# Patient Record
Sex: FEMALE | Race: WHITE | NOT HISPANIC OR LATINO
[De-identification: names, ages, dates, MRNs, and addresses within clinical notes are randomized per-mention and may not be internally consistent; named-entity substitution may affect disease eponyms.]

---

## 2020-01-28 ENCOUNTER — TRANSCRIPTION ENCOUNTER (OUTPATIENT)
Age: 25
End: 2020-01-28

## 2022-07-17 ENCOUNTER — NON-APPOINTMENT (OUTPATIENT)
Age: 27
End: 2022-07-17

## 2022-11-01 ENCOUNTER — APPOINTMENT (OUTPATIENT)
Dept: OBGYN | Facility: CLINIC | Age: 27
End: 2022-11-01
Payer: COMMERCIAL

## 2022-11-01 ENCOUNTER — ASOB RESULT (OUTPATIENT)
Age: 27
End: 2022-11-01

## 2022-11-01 VITALS
RESPIRATION RATE: 15 BRPM | OXYGEN SATURATION: 97 % | WEIGHT: 125 LBS | HEART RATE: 91 BPM | TEMPERATURE: 98.3 F | BODY MASS INDEX: 22.15 KG/M2 | SYSTOLIC BLOOD PRESSURE: 97 MMHG | HEIGHT: 63 IN | DIASTOLIC BLOOD PRESSURE: 67 MMHG

## 2022-11-01 DIAGNOSIS — Z83.3 FAMILY HISTORY OF DIABETES MELLITUS: ICD-10-CM

## 2022-11-01 PROCEDURE — 81025 URINE PREGNANCY TEST: CPT

## 2022-11-01 PROCEDURE — 99204 OFFICE O/P NEW MOD 45 MIN: CPT | Mod: 25

## 2022-11-01 PROCEDURE — 36415 COLL VENOUS BLD VENIPUNCTURE: CPT

## 2022-11-01 PROCEDURE — 76817 TRANSVAGINAL US OBSTETRIC: CPT

## 2022-11-01 RX ORDER — DOXYLAMINE SUCCINATE AND PYRIDOXINE HYDROCHLORIDE 10; 10 MG/1; MG/1
10-10 TABLET, DELAYED RELEASE ORAL
Qty: 60 | Refills: 3 | Status: ACTIVE | COMMUNITY
Start: 2022-11-01 | End: 1900-01-01

## 2022-11-02 LAB
ABO + RH PNL BLD: NORMAL
BASOPHILS # BLD AUTO: 0.03 K/UL
BASOPHILS NFR BLD AUTO: 0.3 %
BLD GP AB SCN SERPL QL: NORMAL
C TRACH RRNA SPEC QL NAA+PROBE: NOT DETECTED
EOSINOPHIL # BLD AUTO: 0.12 K/UL
EOSINOPHIL NFR BLD AUTO: 1.2 %
ESTIMATED AVERAGE GLUCOSE: 88 MG/DL
HBA1C MFR BLD HPLC: 4.7 %
HBV SURFACE AG SER QL: NONREACTIVE
HCG UR QL: POSITIVE
HCT VFR BLD CALC: 36.5 %
HCV AB SER QL: NONREACTIVE
HCV S/CO RATIO: 0.21 S/CO
HGB A MFR BLD: 97.3 %
HGB A2 MFR BLD: 2.7 %
HGB BLD-MCNC: 12.2 G/DL
HGB FRACT BLD-IMP: NORMAL
HIV1+2 AB SPEC QL IA.RAPID: NONREACTIVE
IMM GRANULOCYTES NFR BLD AUTO: 0.5 %
LYMPHOCYTES # BLD AUTO: 2.16 K/UL
LYMPHOCYTES NFR BLD AUTO: 21.8 %
MAN DIFF?: NORMAL
MCHC RBC-ENTMCNC: 30.7 PG
MCHC RBC-ENTMCNC: 33.4 GM/DL
MCV RBC AUTO: 91.9 FL
MEV IGG FLD QL IA: 26 AU/ML
MEV IGG+IGM SER-IMP: POSITIVE
MONOCYTES # BLD AUTO: 0.46 K/UL
MONOCYTES NFR BLD AUTO: 4.6 %
N GONORRHOEA RRNA SPEC QL NAA+PROBE: NOT DETECTED
NEUTROPHILS # BLD AUTO: 7.1 K/UL
NEUTROPHILS NFR BLD AUTO: 71.6 %
PLATELET # BLD AUTO: 206 K/UL
QUALITY CONTROL: YES
RBC # BLD: 3.97 M/UL
RBC # FLD: 13.6 %
RUBV IGG FLD-ACNC: 2.6 INDEX
RUBV IGG SER-IMP: POSITIVE
SOURCE AMPLIFICATION: NORMAL
T PALLIDUM AB SER QL IA: NEGATIVE
VZV AB TITR SER: NEGATIVE
VZV IGG SER IF-ACNC: 30 INDEX
WBC # FLD AUTO: 9.92 K/UL

## 2022-11-02 NOTE — PLAN
[FreeTextEntry1] : 26yo P0  @  9w 5d (MACRINA by LMP/US, confirmed with [facility]) here for initial prenatal visit. \par \par Discussed following with patient:\par 1. Patient was oriented to the practice and goal/plan of care. \par 2. Adequate/optimal weight gain during pregnancy discussed. Nutrition counseling provided. \par 3. Diet precautions were reviewed including food borne infections such as listeria, salmonella. Advised to avoid high mercury content fish. \par 4. Exercise guidelines discussed. encouraged at least 30minute/day moderate intensity exercise/activity 5 times a week as tolerated. Advised to avoid any activities that may involve fall, direct trauma. Advised to avoid hot tubs or saunas. \par 5. Travel plans discussed. \par 6. COVID-19 discussed. General precautions reviewed. Pregnancy specific issues such as risk of infection on pregnant patients, vertical transmission and hospital policy discussed. \par 7. Aneuploidy test including screening tests (NIPT, sequential screen) and diagnostic tests (CVS, amniocentesis) discussed. Through shared decision making patient decided to get ultrascreen and NIPT. \par 8. nausea and vomiting in pregnancy: lifestyle modifications discussed. unison/pyridoxine recommended. \par 9. routine prenatal labs sent\par \par All questions were answered to patient's satisfaction. Patient verbalized understanding.\par Overall 45 minutes were spent with the patient during this visit\par Alla SIMMONS\par

## 2022-11-02 NOTE — HISTORY OF PRESENT ILLNESS
[FreeTextEntry1] : 26yo P0 LMP 8/25/22 here for positive pregnancy test.\par nausea. no vb or cramping.\par \par pt here with  Michele. 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 \par \par pap 7/2022= neg

## 2022-11-03 LAB — LEAD BLD-MCNC: <1 UG/DL

## 2022-11-04 LAB — BACTERIA UR CULT: NORMAL

## 2022-11-07 LAB — FMR1 GENE MUT ANL BLD/T: NORMAL

## 2022-11-08 ENCOUNTER — APPOINTMENT (OUTPATIENT)
Dept: OBGYN | Facility: CLINIC | Age: 27
End: 2022-11-08

## 2022-11-08 ENCOUNTER — TRANSCRIPTION ENCOUNTER (OUTPATIENT)
Age: 27
End: 2022-11-08

## 2022-11-08 VITALS
SYSTOLIC BLOOD PRESSURE: 89 MMHG | OXYGEN SATURATION: 96 % | TEMPERATURE: 98.4 F | DIASTOLIC BLOOD PRESSURE: 59 MMHG | HEART RATE: 105 BPM | WEIGHT: 127 LBS | BODY MASS INDEX: 22.5 KG/M2 | RESPIRATION RATE: 15 BRPM | HEIGHT: 63 IN

## 2022-11-08 DIAGNOSIS — Z32.01 ENCOUNTER FOR PREGNANCY TEST, RESULT POSITIVE: ICD-10-CM

## 2022-11-08 LAB — AR GENE MUT ANL BLD/T: NORMAL

## 2022-11-08 PROCEDURE — 0501F PRENATAL FLOW SHEET: CPT

## 2022-11-08 PROCEDURE — 36415 COLL VENOUS BLD VENIPUNCTURE: CPT

## 2022-11-09 LAB — CFTR MUT TESTED BLD/T: NEGATIVE

## 2022-11-14 ENCOUNTER — ASOB RESULT (OUTPATIENT)
Age: 27
End: 2022-11-14

## 2022-11-14 ENCOUNTER — APPOINTMENT (OUTPATIENT)
Dept: ANTEPARTUM | Facility: CLINIC | Age: 27
End: 2022-11-14

## 2022-11-14 PROCEDURE — 76813 OB US NUCHAL MEAS 1 GEST: CPT | Mod: 59

## 2022-11-14 PROCEDURE — 76801 OB US < 14 WKS SINGLE FETUS: CPT

## 2022-12-20 ENCOUNTER — APPOINTMENT (OUTPATIENT)
Dept: OBGYN | Facility: CLINIC | Age: 27
End: 2022-12-20

## 2022-12-20 VITALS
TEMPERATURE: 98.4 F | HEIGHT: 63 IN | HEART RATE: 89 BPM | BODY MASS INDEX: 23.39 KG/M2 | OXYGEN SATURATION: 97 % | SYSTOLIC BLOOD PRESSURE: 101 MMHG | WEIGHT: 132 LBS | RESPIRATION RATE: 15 BRPM | DIASTOLIC BLOOD PRESSURE: 67 MMHG

## 2022-12-20 DIAGNOSIS — Z34.91 ENCOUNTER FOR SUPERVISION OF NORMAL PREGNANCY, UNSPECIFIED, FIRST TRIMESTER: ICD-10-CM

## 2022-12-20 DIAGNOSIS — O21.9 VOMITING OF PREGNANCY, UNSPECIFIED: ICD-10-CM

## 2022-12-20 PROCEDURE — 0502F SUBSEQUENT PRENATAL CARE: CPT

## 2022-12-20 PROCEDURE — 36415 COLL VENOUS BLD VENIPUNCTURE: CPT

## 2022-12-21 PROBLEM — O21.9 NAUSEA AND VOMITING OF PREGNANCY, ANTEPARTUM: Status: RESOLVED | Noted: 2022-11-01 | Resolved: 2022-12-21

## 2022-12-23 LAB
AFP MOM: 1.16
AFP VALUE: 49.1 NG/ML
ALPHA FETOPROTEIN SERUM COMMENT: NORMAL
ALPHA FETOPROTEIN SERUM INTERPRETATION: NORMAL
ALPHA FETOPROTEIN SERUM RESULTS: NORMAL
ALPHA FETOPROTEIN SERUM TEST RESULTS: NORMAL
GESTATIONAL AGE BASED ON: NORMAL
GESTATIONAL AGE ON COLLECTION DATE: 16.7 WEEKS
INSULIN DEP DIABETES: NO
MATERNAL AGE AT EDD AFP: 28.4 YR
MULTIPLE GESTATION: NO
OSBR RISK 1 IN: 7366
RACE: NORMAL
WEIGHT AFP: 130 LBS

## 2023-01-17 ENCOUNTER — APPOINTMENT (OUTPATIENT)
Dept: ANTEPARTUM | Facility: CLINIC | Age: 28
End: 2023-01-17
Payer: COMMERCIAL

## 2023-01-17 ENCOUNTER — ASOB RESULT (OUTPATIENT)
Age: 28
End: 2023-01-17

## 2023-01-17 PROCEDURE — 76811 OB US DETAILED SNGL FETUS: CPT

## 2023-01-26 ENCOUNTER — APPOINTMENT (OUTPATIENT)
Dept: OBGYN | Facility: CLINIC | Age: 28
End: 2023-01-26
Payer: COMMERCIAL

## 2023-01-26 VITALS
RESPIRATION RATE: 15 BRPM | HEIGHT: 63 IN | SYSTOLIC BLOOD PRESSURE: 105 MMHG | WEIGHT: 142 LBS | HEART RATE: 107 BPM | DIASTOLIC BLOOD PRESSURE: 69 MMHG | BODY MASS INDEX: 25.16 KG/M2 | TEMPERATURE: 97.9 F | OXYGEN SATURATION: 96 %

## 2023-01-26 PROCEDURE — 0502F SUBSEQUENT PRENATAL CARE: CPT

## 2023-02-23 ENCOUNTER — APPOINTMENT (OUTPATIENT)
Dept: OBGYN | Facility: CLINIC | Age: 28
End: 2023-02-23
Payer: COMMERCIAL

## 2023-02-23 VITALS
DIASTOLIC BLOOD PRESSURE: 60 MMHG | HEART RATE: 111 BPM | OXYGEN SATURATION: 99 % | WEIGHT: 143 LBS | BODY MASS INDEX: 25.34 KG/M2 | RESPIRATION RATE: 18 BRPM | SYSTOLIC BLOOD PRESSURE: 93 MMHG | HEIGHT: 63 IN | TEMPERATURE: 98 F

## 2023-02-23 LAB
GLUCOSE UR-MCNC: NEGATIVE
KETONES UR-MCNC: NEGATIVE
LEUKOCYTE ESTERASE UR QL STRIP: NORMAL
NITRITE UR QL STRIP: NEGATIVE
PROT UR STRIP-MCNC: NORMAL

## 2023-02-23 PROCEDURE — 36415 COLL VENOUS BLD VENIPUNCTURE: CPT

## 2023-02-23 PROCEDURE — 0502F SUBSEQUENT PRENATAL CARE: CPT

## 2023-03-01 LAB
BASOPHILS # BLD AUTO: 0.02 K/UL
BASOPHILS NFR BLD AUTO: 0.2 %
EOSINOPHIL # BLD AUTO: 0.06 K/UL
EOSINOPHIL NFR BLD AUTO: 0.6 %
GLUCOSE 1H P 50 G GLC PO SERPL-MCNC: 118 MG/DL
HCT VFR BLD CALC: 31.2 %
HGB BLD-MCNC: 9.3 G/DL
IMM GRANULOCYTES NFR BLD AUTO: 2.1 %
LYMPHOCYTES # BLD AUTO: 1.5 K/UL
LYMPHOCYTES NFR BLD AUTO: 14.1 %
MAN DIFF?: NORMAL
MCHC RBC-ENTMCNC: 29.2 PG
MCHC RBC-ENTMCNC: 29.8 GM/DL
MCV RBC AUTO: 97.8 FL
MONOCYTES # BLD AUTO: 0.53 K/UL
MONOCYTES NFR BLD AUTO: 5 %
NEUTROPHILS # BLD AUTO: 8.28 K/UL
NEUTROPHILS NFR BLD AUTO: 78 %
PLATELET # BLD AUTO: 160 K/UL
RBC # BLD: 3.19 M/UL
RBC # FLD: 15.2 %
WBC # FLD AUTO: 10.61 K/UL

## 2023-03-01 RX ORDER — CHLORHEXIDINE GLUCONATE 4 %
325 (65 FE) LIQUID (ML) TOPICAL TWICE DAILY
Qty: 60 | Refills: 6 | Status: ACTIVE | COMMUNITY
Start: 2023-03-01 | End: 1900-01-01

## 2023-03-15 ENCOUNTER — NON-APPOINTMENT (OUTPATIENT)
Age: 28
End: 2023-03-15

## 2023-03-15 ENCOUNTER — APPOINTMENT (OUTPATIENT)
Dept: OBGYN | Facility: CLINIC | Age: 28
End: 2023-03-15
Payer: COMMERCIAL

## 2023-03-15 VITALS
BODY MASS INDEX: 26.05 KG/M2 | OXYGEN SATURATION: 93 % | TEMPERATURE: 98.7 F | HEART RATE: 100 BPM | RESPIRATION RATE: 18 BRPM | DIASTOLIC BLOOD PRESSURE: 79 MMHG | WEIGHT: 147 LBS | HEIGHT: 63 IN | SYSTOLIC BLOOD PRESSURE: 118 MMHG

## 2023-03-15 DIAGNOSIS — Z34.92 ENCOUNTER FOR SUPERVISION OF NORMAL PREGNANCY, UNSPECIFIED, SECOND TRIMESTER: ICD-10-CM

## 2023-03-15 LAB
GLUCOSE UR-MCNC: NORMAL
HGB UR QL STRIP.AUTO: NORMAL
KETONES UR-MCNC: NORMAL
LEUKOCYTE ESTERASE UR QL STRIP: NORMAL
NITRITE UR QL STRIP: NORMAL
PROT UR STRIP-MCNC: NORMAL

## 2023-03-15 PROCEDURE — 0502F SUBSEQUENT PRENATAL CARE: CPT

## 2023-03-29 ENCOUNTER — ASOB RESULT (OUTPATIENT)
Age: 28
End: 2023-03-29

## 2023-03-29 ENCOUNTER — APPOINTMENT (OUTPATIENT)
Dept: OBGYN | Facility: CLINIC | Age: 28
End: 2023-03-29
Payer: COMMERCIAL

## 2023-03-29 VITALS
HEART RATE: 104 BPM | BODY MASS INDEX: 26.58 KG/M2 | RESPIRATION RATE: 18 BRPM | TEMPERATURE: 98.2 F | HEIGHT: 63 IN | SYSTOLIC BLOOD PRESSURE: 107 MMHG | OXYGEN SATURATION: 97 % | WEIGHT: 150 LBS | DIASTOLIC BLOOD PRESSURE: 72 MMHG

## 2023-03-29 LAB
GLUCOSE UR-MCNC: NEGATIVE
KETONES UR-MCNC: NEGATIVE
LEUKOCYTE ESTERASE UR QL STRIP: NORMAL
NITRITE UR QL STRIP: NEGATIVE
PROT UR STRIP-MCNC: NEGATIVE

## 2023-03-29 PROCEDURE — ZZZZZ: CPT

## 2023-03-29 PROCEDURE — 76819 FETAL BIOPHYS PROFIL W/O NST: CPT

## 2023-03-29 PROCEDURE — 0502F SUBSEQUENT PRENATAL CARE: CPT

## 2023-04-12 ENCOUNTER — APPOINTMENT (OUTPATIENT)
Dept: OBGYN | Facility: CLINIC | Age: 28
End: 2023-04-12
Payer: COMMERCIAL

## 2023-04-12 VITALS
WEIGHT: 155 LBS | RESPIRATION RATE: 18 BRPM | HEIGHT: 63 IN | DIASTOLIC BLOOD PRESSURE: 73 MMHG | BODY MASS INDEX: 27.46 KG/M2 | HEART RATE: 105 BPM | OXYGEN SATURATION: 95 % | SYSTOLIC BLOOD PRESSURE: 111 MMHG | TEMPERATURE: 98.5 F

## 2023-04-12 PROCEDURE — 0502F SUBSEQUENT PRENATAL CARE: CPT

## 2023-04-13 LAB
BILIRUB UR QL STRIP: NEGATIVE
GLUCOSE UR-MCNC: NEGATIVE
HCG UR QL: 0.2 EU/DL
HGB UR QL STRIP.AUTO: NEGATIVE
KETONES UR-MCNC: NEGATIVE
LEUKOCYTE ESTERASE UR QL STRIP: NORMAL
NITRITE UR QL STRIP: NEGATIVE
PH UR STRIP: 7
PROT UR STRIP-MCNC: NEGATIVE
SP GR UR STRIP: 1.01

## 2023-04-19 ENCOUNTER — ASOB RESULT (OUTPATIENT)
Age: 28
End: 2023-04-19

## 2023-04-19 ENCOUNTER — APPOINTMENT (OUTPATIENT)
Dept: ANTEPARTUM | Facility: CLINIC | Age: 28
End: 2023-04-19
Payer: COMMERCIAL

## 2023-04-19 PROCEDURE — 76816 OB US FOLLOW-UP PER FETUS: CPT

## 2023-04-26 ENCOUNTER — APPOINTMENT (OUTPATIENT)
Dept: OBGYN | Facility: CLINIC | Age: 28
End: 2023-04-26
Payer: COMMERCIAL

## 2023-04-26 VITALS
HEIGHT: 63 IN | DIASTOLIC BLOOD PRESSURE: 69 MMHG | OXYGEN SATURATION: 100 % | RESPIRATION RATE: 18 BRPM | SYSTOLIC BLOOD PRESSURE: 99 MMHG | WEIGHT: 157 LBS | BODY MASS INDEX: 27.82 KG/M2 | TEMPERATURE: 98.5 F | HEART RATE: 96 BPM

## 2023-04-26 LAB
BILIRUB UR QL STRIP: NEGATIVE
CLARITY UR: CLEAR
COLLECTION METHOD: NORMAL
GLUCOSE UR-MCNC: NEGATIVE
HCG UR QL: 0.2 EU/DL
HGB UR QL STRIP.AUTO: NEGATIVE
KETONES UR-MCNC: NEGATIVE
LEUKOCYTE ESTERASE UR QL STRIP: ABNORMAL
NITRITE UR QL STRIP: NEGATIVE
PH UR STRIP: 7
PROT UR STRIP-MCNC: NEGATIVE
SP GR UR STRIP: 1.01

## 2023-04-26 PROCEDURE — 0502F SUBSEQUENT PRENATAL CARE: CPT

## 2023-05-10 ENCOUNTER — APPOINTMENT (OUTPATIENT)
Dept: OBGYN | Facility: CLINIC | Age: 28
End: 2023-05-10
Payer: COMMERCIAL

## 2023-05-10 VITALS
RESPIRATION RATE: 18 BRPM | BODY MASS INDEX: 28.53 KG/M2 | TEMPERATURE: 98.1 F | SYSTOLIC BLOOD PRESSURE: 118 MMHG | OXYGEN SATURATION: 97 % | WEIGHT: 161 LBS | DIASTOLIC BLOOD PRESSURE: 77 MMHG | HEIGHT: 63 IN | HEART RATE: 102 BPM

## 2023-05-10 PROCEDURE — 36415 COLL VENOUS BLD VENIPUNCTURE: CPT

## 2023-05-10 PROCEDURE — 0502F SUBSEQUENT PRENATAL CARE: CPT

## 2023-05-11 LAB
BASOPHILS # BLD AUTO: 0.03 K/UL
BASOPHILS NFR BLD AUTO: 0.3 %
EOSINOPHIL # BLD AUTO: 0.07 K/UL
EOSINOPHIL NFR BLD AUTO: 0.6 %
HCT VFR BLD CALC: 36.8 %
HGB BLD-MCNC: 11.2 G/DL
HIV1+2 AB SPEC QL IA.RAPID: NONREACTIVE
IMM GRANULOCYTES NFR BLD AUTO: 2.1 %
LYMPHOCYTES # BLD AUTO: 1.85 K/UL
LYMPHOCYTES NFR BLD AUTO: 15.8 %
MAN DIFF?: NORMAL
MCHC RBC-ENTMCNC: 29.6 PG
MCHC RBC-ENTMCNC: 30.4 GM/DL
MCV RBC AUTO: 97.1 FL
MONOCYTES # BLD AUTO: 0.6 K/UL
MONOCYTES NFR BLD AUTO: 5.1 %
NEUTROPHILS # BLD AUTO: 8.9 K/UL
NEUTROPHILS NFR BLD AUTO: 76.1 %
PLATELET # BLD AUTO: 143 K/UL
RBC # BLD: 3.79 M/UL
RBC # FLD: 18.5 %
T PALLIDUM AB SER QL IA: NEGATIVE
WBC # FLD AUTO: 11.69 K/UL

## 2023-05-15 LAB
C TRACH RRNA SPEC QL NAA+PROBE: NOT DETECTED
GP B STREP DNA SPEC QL NAA+PROBE: DETECTED
N GONORRHOEA RRNA SPEC QL NAA+PROBE: NOT DETECTED
SOURCE AMPLIFICATION: NORMAL
SOURCE GBS: NORMAL

## 2023-05-17 ENCOUNTER — ASOB RESULT (OUTPATIENT)
Age: 28
End: 2023-05-17

## 2023-05-17 ENCOUNTER — APPOINTMENT (OUTPATIENT)
Dept: OBGYN | Facility: CLINIC | Age: 28
End: 2023-05-17
Payer: COMMERCIAL

## 2023-05-17 ENCOUNTER — APPOINTMENT (OUTPATIENT)
Dept: ANTEPARTUM | Facility: CLINIC | Age: 28
End: 2023-05-17
Payer: COMMERCIAL

## 2023-05-17 VITALS
TEMPERATURE: 98.4 F | HEIGHT: 63 IN | RESPIRATION RATE: 18 BRPM | BODY MASS INDEX: 28.7 KG/M2 | SYSTOLIC BLOOD PRESSURE: 104 MMHG | WEIGHT: 162 LBS | DIASTOLIC BLOOD PRESSURE: 73 MMHG | OXYGEN SATURATION: 97 % | HEART RATE: 101 BPM

## 2023-05-17 LAB
BILIRUB UR QL STRIP: NEGATIVE
GLUCOSE UR-MCNC: NEGATIVE
HCG UR QL: 0.2 EU/DL
HGB UR QL STRIP.AUTO: NEGATIVE
KETONES UR-MCNC: NEGATIVE
LEUKOCYTE ESTERASE UR QL STRIP: NEGATIVE
NITRITE UR QL STRIP: NEGATIVE
PH UR STRIP: 7
PROT UR STRIP-MCNC: NEGATIVE
SP GR UR STRIP: 1.01

## 2023-05-17 PROCEDURE — 0502F SUBSEQUENT PRENATAL CARE: CPT

## 2023-05-17 PROCEDURE — 76816 OB US FOLLOW-UP PER FETUS: CPT

## 2023-05-24 ENCOUNTER — APPOINTMENT (OUTPATIENT)
Dept: OBGYN | Facility: CLINIC | Age: 28
End: 2023-05-24
Payer: COMMERCIAL

## 2023-05-24 ENCOUNTER — OUTPATIENT (OUTPATIENT)
Dept: OUTPATIENT SERVICES | Facility: HOSPITAL | Age: 28
LOS: 1 days | End: 2023-05-24

## 2023-05-24 VITALS
RESPIRATION RATE: 16 BRPM | DIASTOLIC BLOOD PRESSURE: 76 MMHG | TEMPERATURE: 98.1 F | WEIGHT: 164 LBS | HEIGHT: 63 IN | OXYGEN SATURATION: 98 % | HEART RATE: 101 BPM | SYSTOLIC BLOOD PRESSURE: 105 MMHG | BODY MASS INDEX: 29.06 KG/M2

## 2023-05-24 DIAGNOSIS — Z01.818 ENCOUNTER FOR OTHER PREPROCEDURAL EXAMINATION: ICD-10-CM

## 2023-05-24 DIAGNOSIS — Z34.00 ENCOUNTER FOR SUPERVISION OF NORMAL FIRST PREGNANCY, UNSPECIFIED TRIMESTER: ICD-10-CM

## 2023-05-24 LAB
ALBUMIN SERPL ELPH-MCNC: 2.9 G/DL — LOW (ref 3.5–5)
ALP SERPL-CCNC: 118 U/L — SIGNIFICANT CHANGE UP (ref 40–120)
ALT FLD-CCNC: 17 U/L DA — SIGNIFICANT CHANGE UP (ref 10–60)
ANION GAP SERPL CALC-SCNC: 8 MMOL/L — SIGNIFICANT CHANGE UP (ref 5–17)
APTT BLD: 23.3 SEC — LOW (ref 27.5–35.5)
AST SERPL-CCNC: 19 U/L — SIGNIFICANT CHANGE UP (ref 10–40)
BILIRUB SERPL-MCNC: 0.4 MG/DL — SIGNIFICANT CHANGE UP (ref 0.2–1.2)
BILIRUB UR QL STRIP: NEGATIVE
BLD GP AB SCN SERPL QL: SIGNIFICANT CHANGE UP
BUN SERPL-MCNC: 8 MG/DL — SIGNIFICANT CHANGE UP (ref 7–18)
CALCIUM SERPL-MCNC: 9.2 MG/DL — SIGNIFICANT CHANGE UP (ref 8.4–10.5)
CHLORIDE SERPL-SCNC: 106 MMOL/L — SIGNIFICANT CHANGE UP (ref 96–108)
CO2 SERPL-SCNC: 24 MMOL/L — SIGNIFICANT CHANGE UP (ref 22–31)
CREAT SERPL-MCNC: 0.5 MG/DL — SIGNIFICANT CHANGE UP (ref 0.5–1.3)
EGFR: 131 ML/MIN/1.73M2 — SIGNIFICANT CHANGE UP
GLUCOSE SERPL-MCNC: 87 MG/DL — SIGNIFICANT CHANGE UP (ref 70–99)
GLUCOSE UR-MCNC: NEGATIVE
HCG UR QL: 0.2 EU/DL
HCT VFR BLD CALC: 36.4 % — SIGNIFICANT CHANGE UP (ref 34.5–45)
HGB BLD-MCNC: 12 G/DL — SIGNIFICANT CHANGE UP (ref 11.5–15.5)
HGB UR QL STRIP.AUTO: NEGATIVE
INR BLD: 0.98 RATIO — SIGNIFICANT CHANGE UP (ref 0.88–1.16)
KETONES UR-MCNC: NEGATIVE
LEUKOCYTE ESTERASE UR QL STRIP: NEGATIVE
MCHC RBC-ENTMCNC: 30.9 PG — SIGNIFICANT CHANGE UP (ref 27–34)
MCHC RBC-ENTMCNC: 33 GM/DL — SIGNIFICANT CHANGE UP (ref 32–36)
MCV RBC AUTO: 93.8 FL — SIGNIFICANT CHANGE UP (ref 80–100)
NITRITE UR QL STRIP: NEGATIVE
NRBC # BLD: 0 /100 WBCS — SIGNIFICANT CHANGE UP (ref 0–0)
PH UR STRIP: 6.5
PLATELET # BLD AUTO: 129 K/UL — LOW (ref 150–400)
POTASSIUM SERPL-MCNC: 3.7 MMOL/L — SIGNIFICANT CHANGE UP (ref 3.5–5.3)
POTASSIUM SERPL-SCNC: 3.7 MMOL/L — SIGNIFICANT CHANGE UP (ref 3.5–5.3)
PROT SERPL-MCNC: 7 G/DL — SIGNIFICANT CHANGE UP (ref 6–8.3)
PROT UR STRIP-MCNC: NEGATIVE
PROTHROM AB SERPL-ACNC: 11.7 SEC — SIGNIFICANT CHANGE UP (ref 10.5–13.4)
RBC # BLD: 3.88 M/UL — SIGNIFICANT CHANGE UP (ref 3.8–5.2)
RBC # FLD: 18 % — HIGH (ref 10.3–14.5)
SODIUM SERPL-SCNC: 138 MMOL/L — SIGNIFICANT CHANGE UP (ref 135–145)
SP GR UR STRIP: 1.02
WBC # BLD: 11.96 K/UL — HIGH (ref 3.8–10.5)
WBC # FLD AUTO: 11.96 K/UL — HIGH (ref 3.8–10.5)

## 2023-05-24 PROCEDURE — 0502F SUBSEQUENT PRENATAL CARE: CPT

## 2023-05-25 ENCOUNTER — TRANSCRIPTION ENCOUNTER (OUTPATIENT)
Age: 28
End: 2023-05-25

## 2023-05-25 LAB — T PALLIDUM AB TITR SER: NEGATIVE — SIGNIFICANT CHANGE UP

## 2023-05-26 ENCOUNTER — INPATIENT (INPATIENT)
Facility: HOSPITAL | Age: 28
LOS: 1 days | Discharge: HOME | End: 2023-05-28
Attending: OBSTETRICS & GYNECOLOGY | Admitting: OBSTETRICS & GYNECOLOGY
Payer: COMMERCIAL

## 2023-05-26 VITALS — HEIGHT: 63 IN | WEIGHT: 164.91 LBS

## 2023-05-26 DIAGNOSIS — Z34.00 ENCOUNTER FOR SUPERVISION OF NORMAL FIRST PREGNANCY, UNSPECIFIED TRIMESTER: ICD-10-CM

## 2023-05-26 PROCEDURE — 59510 CESAREAN DELIVERY: CPT

## 2023-05-26 RX ORDER — HEPARIN SODIUM 5000 [USP'U]/ML
5000 INJECTION INTRAVENOUS; SUBCUTANEOUS EVERY 12 HOURS
Refills: 0 | Status: DISCONTINUED | OUTPATIENT
Start: 2023-05-27 | End: 2023-05-28

## 2023-05-26 RX ORDER — CEFAZOLIN SODIUM 1 G
2000 VIAL (EA) INJECTION ONCE
Refills: 0 | Status: COMPLETED | OUTPATIENT
Start: 2023-05-26 | End: 2023-05-26

## 2023-05-26 RX ORDER — FERROUS SULFATE 325(65) MG
325 TABLET ORAL DAILY
Refills: 0 | Status: DISCONTINUED | OUTPATIENT
Start: 2023-05-26 | End: 2023-05-28

## 2023-05-26 RX ORDER — CITRIC ACID/SODIUM CITRATE 300-500 MG
30 SOLUTION, ORAL ORAL ONCE
Refills: 0 | Status: COMPLETED | OUTPATIENT
Start: 2023-05-26 | End: 2023-05-26

## 2023-05-26 RX ORDER — LANOLIN
1 OINTMENT (GRAM) TOPICAL EVERY 6 HOURS
Refills: 0 | Status: DISCONTINUED | OUTPATIENT
Start: 2023-05-26 | End: 2023-05-28

## 2023-05-26 RX ORDER — FAMOTIDINE 10 MG/ML
20 INJECTION INTRAVENOUS ONCE
Refills: 0 | Status: COMPLETED | OUTPATIENT
Start: 2023-05-26 | End: 2023-05-26

## 2023-05-26 RX ORDER — IBUPROFEN 200 MG
600 TABLET ORAL EVERY 6 HOURS
Refills: 0 | Status: COMPLETED | OUTPATIENT
Start: 2023-05-26 | End: 2024-04-23

## 2023-05-26 RX ORDER — MAGNESIUM HYDROXIDE 400 MG/1
30 TABLET, CHEWABLE ORAL
Refills: 0 | Status: DISCONTINUED | OUTPATIENT
Start: 2023-05-26 | End: 2023-05-28

## 2023-05-26 RX ORDER — SIMETHICONE 80 MG/1
80 TABLET, CHEWABLE ORAL EVERY 4 HOURS
Refills: 0 | Status: DISCONTINUED | OUTPATIENT
Start: 2023-05-26 | End: 2023-05-28

## 2023-05-26 RX ORDER — OXYCODONE HYDROCHLORIDE 5 MG/1
5 TABLET ORAL EVERY 4 HOURS
Refills: 0 | Status: COMPLETED | OUTPATIENT
Start: 2023-05-26 | End: 2023-06-02

## 2023-05-26 RX ORDER — SODIUM CHLORIDE 9 MG/ML
1000 INJECTION, SOLUTION INTRAVENOUS
Refills: 0 | Status: DISCONTINUED | OUTPATIENT
Start: 2023-05-26 | End: 2023-05-26

## 2023-05-26 RX ORDER — DIPHENHYDRAMINE HCL 50 MG
25 CAPSULE ORAL EVERY 6 HOURS
Refills: 0 | Status: DISCONTINUED | OUTPATIENT
Start: 2023-05-26 | End: 2023-05-28

## 2023-05-26 RX ORDER — ACETAMINOPHEN 500 MG
975 TABLET ORAL
Refills: 0 | Status: DISCONTINUED | OUTPATIENT
Start: 2023-05-26 | End: 2023-05-28

## 2023-05-26 RX ORDER — TETANUS TOXOID, REDUCED DIPHTHERIA TOXOID AND ACELLULAR PERTUSSIS VACCINE, ADSORBED 5; 2.5; 8; 8; 2.5 [IU]/.5ML; [IU]/.5ML; UG/.5ML; UG/.5ML; UG/.5ML
0.5 SUSPENSION INTRAMUSCULAR ONCE
Refills: 0 | Status: DISCONTINUED | OUTPATIENT
Start: 2023-05-26 | End: 2023-05-28

## 2023-05-26 RX ORDER — KETOROLAC TROMETHAMINE 30 MG/ML
30 SYRINGE (ML) INJECTION EVERY 6 HOURS
Refills: 0 | Status: DISCONTINUED | OUTPATIENT
Start: 2023-05-26 | End: 2023-05-27

## 2023-05-26 RX ORDER — OXYTOCIN 10 UNIT/ML
333.33 VIAL (ML) INJECTION
Qty: 20 | Refills: 0 | Status: DISCONTINUED | OUTPATIENT
Start: 2023-05-26 | End: 2023-05-28

## 2023-05-26 RX ORDER — SODIUM CHLORIDE 9 MG/ML
1000 INJECTION, SOLUTION INTRAVENOUS ONCE
Refills: 0 | Status: COMPLETED | OUTPATIENT
Start: 2023-05-26 | End: 2023-05-26

## 2023-05-26 RX ORDER — FOLIC ACID 0.8 MG
1 TABLET ORAL DAILY
Refills: 0 | Status: DISCONTINUED | OUTPATIENT
Start: 2023-05-26 | End: 2023-05-28

## 2023-05-26 RX ADMIN — Medication 30 MILLIGRAM(S): at 13:29

## 2023-05-26 RX ADMIN — Medication 100 MILLIGRAM(S): at 11:14

## 2023-05-26 RX ADMIN — SIMETHICONE 80 MILLIGRAM(S): 80 TABLET, CHEWABLE ORAL at 21:22

## 2023-05-26 RX ADMIN — SODIUM CHLORIDE 125 MILLILITER(S): 9 INJECTION, SOLUTION INTRAVENOUS at 13:28

## 2023-05-26 RX ADMIN — Medication 1000 MILLIUNIT(S)/MIN: at 11:48

## 2023-05-26 RX ADMIN — SODIUM CHLORIDE 2000 MILLILITER(S): 9 INJECTION, SOLUTION INTRAVENOUS at 09:57

## 2023-05-26 RX ADMIN — Medication 30 MILLIGRAM(S): at 23:16

## 2023-05-26 RX ADMIN — FAMOTIDINE 20 MILLIGRAM(S): 10 INJECTION INTRAVENOUS at 09:23

## 2023-05-26 RX ADMIN — Medication 30 MILLIGRAM(S): at 13:44

## 2023-05-26 RX ADMIN — Medication 30 MILLILITER(S): at 09:23

## 2023-05-26 NOTE — PATIENT PROFILE OB - FALL HARM RISK - UNIVERSAL INTERVENTIONS
Bed in lowest position, wheels locked, appropriate side rails in place/Call bell, personal items and telephone in reach/Instruct patient to call for assistance before getting out of bed or chair/Non-slip footwear when patient is out of bed/[place] to call system/Physically safe environment - no spills, clutter or unnecessary equipment/Purposeful Proactive Rounding/Room/bathroom lighting operational, light cord in reach

## 2023-05-27 ENCOUNTER — TRANSCRIPTION ENCOUNTER (OUTPATIENT)
Age: 28
End: 2023-05-27

## 2023-05-27 LAB
BASOPHILS # BLD AUTO: 0.02 K/UL — SIGNIFICANT CHANGE UP (ref 0–0.2)
BASOPHILS NFR BLD AUTO: 0.2 % — SIGNIFICANT CHANGE UP (ref 0–2)
EOSINOPHIL # BLD AUTO: 0.03 K/UL — SIGNIFICANT CHANGE UP (ref 0–0.5)
EOSINOPHIL NFR BLD AUTO: 0.3 % — SIGNIFICANT CHANGE UP (ref 0–6)
HCT VFR BLD CALC: 30.2 % — LOW (ref 34.5–45)
HGB BLD-MCNC: 10 G/DL — LOW (ref 11.5–15.5)
IMM GRANULOCYTES NFR BLD AUTO: 1.1 % — HIGH (ref 0–0.9)
LYMPHOCYTES # BLD AUTO: 1.47 K/UL — SIGNIFICANT CHANGE UP (ref 1–3.3)
LYMPHOCYTES # BLD AUTO: 12.7 % — LOW (ref 13–44)
MCHC RBC-ENTMCNC: 30 PG — SIGNIFICANT CHANGE UP (ref 27–34)
MCHC RBC-ENTMCNC: 33.1 GM/DL — SIGNIFICANT CHANGE UP (ref 32–36)
MCV RBC AUTO: 90.7 FL — SIGNIFICANT CHANGE UP (ref 80–100)
MONOCYTES # BLD AUTO: 0.84 K/UL — SIGNIFICANT CHANGE UP (ref 0–0.9)
MONOCYTES NFR BLD AUTO: 7.3 % — SIGNIFICANT CHANGE UP (ref 2–14)
NEUTROPHILS # BLD AUTO: 9.05 K/UL — HIGH (ref 1.8–7.4)
NEUTROPHILS NFR BLD AUTO: 78.4 % — HIGH (ref 43–77)
NRBC # BLD: 0 /100 WBCS — SIGNIFICANT CHANGE UP (ref 0–0)
PLATELET # BLD AUTO: 106 K/UL — LOW (ref 150–400)
RBC # BLD: 3.33 M/UL — LOW (ref 3.8–5.2)
RBC # FLD: 17.3 % — HIGH (ref 10.3–14.5)
WBC # BLD: 11.54 K/UL — HIGH (ref 3.8–10.5)
WBC # FLD AUTO: 11.54 K/UL — HIGH (ref 3.8–10.5)

## 2023-05-27 RX ORDER — OXYCODONE HYDROCHLORIDE 5 MG/1
5 TABLET ORAL EVERY 4 HOURS
Refills: 0 | Status: DISCONTINUED | OUTPATIENT
Start: 2023-05-27 | End: 2023-05-28

## 2023-05-27 RX ORDER — SIMETHICONE 80 MG/1
1 TABLET, CHEWABLE ORAL
Qty: 90 | Refills: 0
Start: 2023-05-27 | End: 2023-06-10

## 2023-05-27 RX ORDER — ACETAMINOPHEN 500 MG
2 TABLET ORAL
Qty: 120 | Refills: 2
Start: 2023-05-27 | End: 2023-07-10

## 2023-05-27 RX ORDER — SENNOSIDES/DOCUSATE SODIUM 8.6MG-50MG
1 TABLET ORAL
Qty: 60 | Refills: 0
Start: 2023-05-27 | End: 2023-06-25

## 2023-05-27 RX ORDER — IBUPROFEN 200 MG
1 TABLET ORAL
Qty: 60 | Refills: 2
Start: 2023-05-27 | End: 2023-07-10

## 2023-05-27 RX ORDER — IBUPROFEN 200 MG
600 TABLET ORAL EVERY 6 HOURS
Refills: 0 | Status: DISCONTINUED | OUTPATIENT
Start: 2023-05-27 | End: 2023-05-28

## 2023-05-27 RX ORDER — FAMOTIDINE 10 MG/ML
1 INJECTION INTRAVENOUS
Qty: 20 | Refills: 0
Start: 2023-05-27 | End: 2023-06-05

## 2023-05-27 RX ADMIN — Medication 975 MILLIGRAM(S): at 21:45

## 2023-05-27 RX ADMIN — OXYCODONE HYDROCHLORIDE 5 MILLIGRAM(S): 5 TABLET ORAL at 22:13

## 2023-05-27 RX ADMIN — Medication 975 MILLIGRAM(S): at 03:00

## 2023-05-27 RX ADMIN — Medication 975 MILLIGRAM(S): at 16:50

## 2023-05-27 RX ADMIN — Medication 30 MILLIGRAM(S): at 06:54

## 2023-05-27 RX ADMIN — SIMETHICONE 80 MILLIGRAM(S): 80 TABLET, CHEWABLE ORAL at 18:05

## 2023-05-27 RX ADMIN — Medication 30 MILLIGRAM(S): at 11:42

## 2023-05-27 RX ADMIN — Medication 1 MILLIGRAM(S): at 11:42

## 2023-05-27 RX ADMIN — Medication 325 MILLIGRAM(S): at 11:42

## 2023-05-27 RX ADMIN — Medication 600 MILLIGRAM(S): at 23:32

## 2023-05-27 RX ADMIN — OXYCODONE HYDROCHLORIDE 5 MILLIGRAM(S): 5 TABLET ORAL at 23:00

## 2023-05-27 RX ADMIN — Medication 30 MILLIGRAM(S): at 00:02

## 2023-05-27 RX ADMIN — Medication 975 MILLIGRAM(S): at 02:55

## 2023-05-27 RX ADMIN — Medication 600 MILLIGRAM(S): at 18:30

## 2023-05-27 RX ADMIN — HEPARIN SODIUM 5000 UNIT(S): 5000 INJECTION INTRAVENOUS; SUBCUTANEOUS at 23:32

## 2023-05-27 RX ADMIN — HEPARIN SODIUM 5000 UNIT(S): 5000 INJECTION INTRAVENOUS; SUBCUTANEOUS at 11:42

## 2023-05-27 RX ADMIN — Medication 975 MILLIGRAM(S): at 09:17

## 2023-05-27 RX ADMIN — Medication 600 MILLIGRAM(S): at 17:57

## 2023-05-27 RX ADMIN — Medication 975 MILLIGRAM(S): at 20:49

## 2023-05-27 RX ADMIN — HEPARIN SODIUM 5000 UNIT(S): 5000 INJECTION INTRAVENOUS; SUBCUTANEOUS at 02:56

## 2023-05-27 RX ADMIN — Medication 30 MILLIGRAM(S): at 12:45

## 2023-05-27 RX ADMIN — Medication 975 MILLIGRAM(S): at 10:18

## 2023-05-27 RX ADMIN — SIMETHICONE 80 MILLIGRAM(S): 80 TABLET, CHEWABLE ORAL at 23:33

## 2023-05-27 RX ADMIN — Medication 1 TABLET(S): at 11:41

## 2023-05-27 RX ADMIN — Medication 975 MILLIGRAM(S): at 15:50

## 2023-05-27 NOTE — DISCHARGE NOTE OB - PATIENT PORTAL LINK FT
You can access the FollowMyHealth Patient Portal offered by [facility] by registering at the following website: [email or web address]. By joining [employer]’s FollowMyHealth portal, you will also be able to view your health information using other applications (apps) compatible with our system.

## 2023-05-27 NOTE — DISCHARGE NOTE OB - CARE PLAN
Principal Discharge DX:	 delivery delivered  Assessment and plan of treatment:	no sex nothing in vagina no heavy lifting no pushing no straining no strenuous activities  pain medication as needed; stool softener; dulcolax as needed if constipated  walk for exercise: helps recovery   continue prenatal vitamins daily especially whole course of breastfeeding  see your OB in the office for follow up post partum check call the office set up appointment in 1-2weeks  clean wound daily with soap and water; please note wound for redness or swelling; if noted go to the office right away so the doctor can evaluate the wound for possible wound infection   1

## 2023-05-27 NOTE — DISCHARGE NOTE OB - MEDICATION SUMMARY - MEDICATIONS TO TAKE
I will START or STAY ON the medications listed below when I get home from the hospital:     mg oral tablet  -- 1 tab(s) by mouth every 6 hours as needed for  moderate pain  -- Indication: For for pain    acetaminophen 500 mg oral capsule  -- 2 cap(s) by mouth every 6 hours as needed for  mild pain  -- Indication: For for pain    famotidine 20 mg oral tablet  -- 1 tab(s) by mouth 2 times a day  -- Indication: For prevents gastritis    Prenatal Multivitamins with Folic Acid 1 mg oral tablet  -- 1 tab(s) by mouth once a day  -- Indication: For Supplement while breastfeeding    Colace 2-in-1 50 mg-8.6 mg oral tablet  -- 1 tab(s) by mouth 2 times a day  -- Indication: For for bowel movement    simethicone 80 mg oral tablet, chewable  -- 1 tab(s) chewed every 4 hours  -- Indication: For helps pass gas    I will START or STAY ON the medications listed below when I get home from the hospital:     mg oral tablet  -- 1 tab(s) by mouth every 6 hours as needed for  moderate pain  -- Indication: For as needed for pain    acetaminophen 500 mg oral capsule  -- 2 cap(s) by mouth every 6 hours as needed for  mild pain  -- Indication: For as needed for pain    Prenatal Multivitamins with Folic Acid 1 mg oral tablet  -- 1 tab(s) by mouth once a day  -- Indication: For Supplement while breastfeeding    Colace 2-in-1 50 mg-8.6 mg oral tablet  -- 1 tab(s) by mouth once a day (at bedtime)  -- Indication: For Stool softener/constipation    simethicone 80 mg oral tablet, chewable  -- 1 tab(s) chewed every 4 hours  -- Indication: For gas discomfort

## 2023-05-27 NOTE — DISCHARGE NOTE OB - NS MD DC FALL RISK RISK
For information on Fall & Injury Prevention, visit: [email or web address] OR  [email or web address] OR  https://www.cdc.gov/steadi/patient.html

## 2023-05-27 NOTE — PROGRESS NOTE ADULT - ASSESSMENT
PA NOTE:  pod#1 s/p scheduled prim cs at 39 1/7wks   in the room   pt doing well                         10.0   11.54 )-----------( 106      ( 27 May 2023 06:00 )             30.2   rpt cbc in am  diet: regular   oob   incentive spirometer  encouraged breastfeeding  dvt ppx   pain management

## 2023-05-27 NOTE — DISCHARGE NOTE OB - MATERIALS PROVIDED
Vaccinations/[facility]  Screening Program/  Immunization Record/Breastfeeding Log/Bottle Feeding Log/Breastfeeding Mother’s Support Group Information/Guide to Postpartum Care/[facility] Hearing Screen Program/Back To Sleep Handout/Shaken Baby Prevention Handout/Breastfeeding Guide and Packet/Birth Certificate Instructions/Discharge Medication Information for Patients and Families Pocket Guide/Prescription for Breast Pump/Tdap Vaccination (VIS Pub Date: 2012)

## 2023-05-27 NOTE — DISCHARGE NOTE OB - CARE PROVIDER_API CALL
Shayne Ortiz  Obstetrics and Gynecology  [address], [address]  [place] [address]  Phone: [phone]  Fax: [phone]  Established Patient  Follow Up Time: 1 week

## 2023-05-28 VITALS
OXYGEN SATURATION: 99 % | TEMPERATURE: 99 F | SYSTOLIC BLOOD PRESSURE: 111 MMHG | HEART RATE: 84 BPM | RESPIRATION RATE: 18 BRPM | DIASTOLIC BLOOD PRESSURE: 62 MMHG

## 2023-05-28 LAB
BASOPHILS # BLD AUTO: 0.02 K/UL — SIGNIFICANT CHANGE UP (ref 0–0.2)
BASOPHILS NFR BLD AUTO: 0.2 % — SIGNIFICANT CHANGE UP (ref 0–2)
EOSINOPHIL # BLD AUTO: 0.12 K/UL — SIGNIFICANT CHANGE UP (ref 0–0.5)
EOSINOPHIL NFR BLD AUTO: 1.4 % — SIGNIFICANT CHANGE UP (ref 0–6)
HCT VFR BLD CALC: 29.4 % — LOW (ref 34.5–45)
HGB BLD-MCNC: 9.4 G/DL — LOW (ref 11.5–15.5)
IMM GRANULOCYTES NFR BLD AUTO: 1.8 % — HIGH (ref 0–0.9)
LYMPHOCYTES # BLD AUTO: 1.29 K/UL — SIGNIFICANT CHANGE UP (ref 1–3.3)
LYMPHOCYTES # BLD AUTO: 14.9 % — SIGNIFICANT CHANGE UP (ref 13–44)
MCHC RBC-ENTMCNC: 30.2 PG — SIGNIFICANT CHANGE UP (ref 27–34)
MCHC RBC-ENTMCNC: 32 GM/DL — SIGNIFICANT CHANGE UP (ref 32–36)
MCV RBC AUTO: 94.5 FL — SIGNIFICANT CHANGE UP (ref 80–100)
MONOCYTES # BLD AUTO: 0.53 K/UL — SIGNIFICANT CHANGE UP (ref 0–0.9)
MONOCYTES NFR BLD AUTO: 6.1 % — SIGNIFICANT CHANGE UP (ref 2–14)
NEUTROPHILS # BLD AUTO: 6.53 K/UL — SIGNIFICANT CHANGE UP (ref 1.8–7.4)
NEUTROPHILS NFR BLD AUTO: 75.6 % — SIGNIFICANT CHANGE UP (ref 43–77)
NRBC # BLD: 0 /100 WBCS — SIGNIFICANT CHANGE UP (ref 0–0)
PLATELET # BLD AUTO: 113 K/UL — LOW (ref 150–400)
RBC # BLD: 3.11 M/UL — LOW (ref 3.8–5.2)
RBC # FLD: 17.6 % — HIGH (ref 10.3–14.5)
WBC # BLD: 8.65 K/UL — SIGNIFICANT CHANGE UP (ref 3.8–10.5)
WBC # FLD AUTO: 8.65 K/UL — SIGNIFICANT CHANGE UP (ref 3.8–10.5)

## 2023-05-28 PROCEDURE — 36415 COLL VENOUS BLD VENIPUNCTURE: CPT

## 2023-05-28 PROCEDURE — 85025 COMPLETE CBC W/AUTO DIFF WBC: CPT

## 2023-05-28 PROCEDURE — 59050 FETAL MONITOR W/REPORT: CPT

## 2023-05-28 RX ORDER — IBUPROFEN 200 MG
1 TABLET ORAL
Qty: 28 | Refills: 0
Start: 2023-05-28 | End: 2023-06-03

## 2023-05-28 RX ORDER — ACETAMINOPHEN 500 MG
2 TABLET ORAL
Qty: 30 | Refills: 0
Start: 2023-05-28 | End: 2023-06-11

## 2023-05-28 RX ORDER — SIMETHICONE 80 MG/1
1 TABLET, CHEWABLE ORAL
Qty: 20 | Refills: 0
Start: 2023-05-28

## 2023-05-28 RX ORDER — IBUPROFEN 200 MG
1 TABLET ORAL
Qty: 30 | Refills: 0
Start: 2023-05-28

## 2023-05-28 RX ORDER — IBUPROFEN 200 MG
1 TABLET ORAL
Qty: 30 | Refills: 0
Start: 2023-05-28 | End: 2023-06-11

## 2023-05-28 RX ORDER — DOCUSATE SODIUM 100 MG
1 CAPSULE ORAL
Qty: 30 | Refills: 0
Start: 2023-05-28

## 2023-05-28 RX ORDER — SENNOSIDES/DOCUSATE SODIUM 8.6MG-50MG
1 TABLET ORAL
Qty: 30 | Refills: 0
Start: 2023-05-28 | End: 2023-06-26

## 2023-05-28 RX ORDER — SIMETHICONE 80 MG/1
1 TABLET, CHEWABLE ORAL
Qty: 20 | Refills: 0
Start: 2023-05-28 | End: 2023-06-11

## 2023-05-28 RX ADMIN — Medication 975 MILLIGRAM(S): at 03:07

## 2023-05-28 RX ADMIN — Medication 600 MILLIGRAM(S): at 00:30

## 2023-05-28 RX ADMIN — Medication 600 MILLIGRAM(S): at 07:48

## 2023-05-28 RX ADMIN — Medication 975 MILLIGRAM(S): at 03:47

## 2023-05-28 RX ADMIN — Medication 600 MILLIGRAM(S): at 06:55

## 2023-05-28 RX ADMIN — Medication 1 TABLET(S): at 11:58

## 2023-05-28 RX ADMIN — SIMETHICONE 80 MILLIGRAM(S): 80 TABLET, CHEWABLE ORAL at 03:07

## 2023-05-28 RX ADMIN — Medication 1 MILLIGRAM(S): at 11:59

## 2023-05-28 RX ADMIN — Medication 325 MILLIGRAM(S): at 11:58

## 2023-05-28 RX ADMIN — Medication 600 MILLIGRAM(S): at 12:59

## 2023-05-28 RX ADMIN — Medication 600 MILLIGRAM(S): at 11:59

## 2023-05-28 NOTE — PROGRESS NOTE ADULT - SUBJECTIVE AND OBJECTIVE BOX
Patient seen resting comfortably.  No new complaints.  Denies HA, CP, SOB, N/V/D, dizziness, palpitations, worsening abdominal pain, worsening vaginal bleeding, or any other concerns. + Ambulation, + void without difficulty, + flatus and tolerating regular diet. Attempting breastfeeding.     Vital Signs Last 24 Hrs  T(C): 36.6 (28 May 2023 06:00), Max: 36.9 (27 May 2023 11:59)  T(F): 97.9 (28 May 2023 06:00), Max: 98.4 (27 May 2023 11:59)  HR: 86 (28 May 2023 06:00) (86 - 100)  BP: 109/73 (28 May 2023 06:00) (100/66 - 109/73)  RR: 18 (28 May 2023 06:00) (17 - 18)  SpO2: 98% (28 May 2023 06:00) (98% - 98%)    Parameters below as of 28 May 2023 06:00  Patient On (Oxygen Delivery Method): room air        Gen: A&O x 3, NAD  Chest: CTABL  Cardiac: S1+S2+ RRR  Breast: Soft, nontender, nonengorged  Abdomen: Nontender, nondistended, +BS, Incision clean, dry and intact   Gyn: Minimal bleeding  Extremities: Nontender, DTRS 2+, no worsening edema                                     9.4    8.65  )-----------( 113      ( 28 May 2023 07:40 )             29.4         A/P:  Patient is a 28 year old P1 s/p  section, POD #2.     -Pain management as needed  -Advance as per protocol  -OOB and ambulate  -Repeat CBC   -Encourage breastfeeding   -DC home in am 
post op note  pt doing well  pain well controlled   in the room    Vital Signs Last 24 Hrs  T(C): 36.6 (26 May 2023 14:45), Max: 36.6 (26 May 2023 13:15)  T(F): 97.9 (26 May 2023 14:45), Max: 97.9 (26 May 2023 13:15)  HR: 114 (26 May 2023 14:45) (103 - 114)  BP: 114/76 (26 May 2023 14:45) (88/62 - 121/61)  BP(mean): 65 (26 May 2023 14:30) (65 - 84)  RR: 20 (26 May 2023 14:45) (15 - 20)  SpO2: 99% (26 May 2023 14:45) (99% - 100%)    Parameters below as of 26 May 2023 14:30  Patient On (Oxygen Delivery Method): room air    abd soft ND no guarding no rebound  inc site c/d/intact +derma mesa  lochia minimal  extrem Venodyne boots b/l 
PA NOTE:  pod#1 s/p scheduled prim cs at 39 1/7wks   in the room   pt doing well     Patient seen at bedside resting comfortably offers no new complaints. + Ambulation, voiding without difficulty, + flatus; tolerating regular diet. both breastfeeding and bottle feeding. Denies HA, CP, SOB, N/V/D,  no bm; dizziness, palpitations, worsening abdominal pain, worsening vaginal bleeding, or any other concerns.     Vital Signs Last 24 Hrs  T(C): 36.8 (27 May 2023 07:00), Max: 36.9 (26 May 2023 23:00)  T(F): 98.2 (27 May 2023 07:00), Max: 98.4 (26 May 2023 23:00)  HR: 98 (27 May 2023 07:00) (91 - 114)  BP: 105/68 (27 May 2023 07:00) (88/62 - 121/61)  BP(mean): 65 (26 May 2023 14:30) (65 - 84)  RR: 18 (27 May 2023 07:00) (15 - 20)  SpO2: 98% (27 May 2023 07:00) (98% - 100%)    Parameters below as of 27 May 2023 07:00  Patient On (Oxygen Delivery Method): room air        Gen: A&O x 3, NAD  Chest: CTABL  Cardiac: S1+S2+ RRR  Breast: Soft, nontender, nonengorged  Abdomen: +BS; soft; Nontender, nondistended, inc site c/d/intact +derma bond no erythema/edema   Gyn: Minimal lochia  Extremities: Nontender, DTRS 2+, no worsening edema

## 2023-06-05 ENCOUNTER — APPOINTMENT (OUTPATIENT)
Dept: ANTEPARTUM | Facility: CLINIC | Age: 28
End: 2023-06-05

## 2023-06-19 ENCOUNTER — APPOINTMENT (OUTPATIENT)
Dept: OBGYN | Facility: CLINIC | Age: 28
End: 2023-06-19
Payer: COMMERCIAL

## 2023-06-19 VITALS
TEMPERATURE: 97.9 F | HEIGHT: 63 IN | BODY MASS INDEX: 24.63 KG/M2 | WEIGHT: 139 LBS | SYSTOLIC BLOOD PRESSURE: 102 MMHG | OXYGEN SATURATION: 96 % | HEART RATE: 97 BPM | RESPIRATION RATE: 18 BRPM | DIASTOLIC BLOOD PRESSURE: 69 MMHG

## 2023-06-19 DIAGNOSIS — Z00.00 ENCOUNTER FOR GENERAL ADULT MEDICAL EXAMINATION W/OUT ABNORMAL FINDINGS: ICD-10-CM

## 2023-06-19 DIAGNOSIS — O26.843 UTERINE SIZE-DATE DISCREPANCY, THIRD TRIMESTER: ICD-10-CM

## 2023-06-19 DIAGNOSIS — Z34.93 ENCOUNTER FOR SUPERVISION OF NORMAL PREGNANCY, UNSPECIFIED, THIRD TRIMESTER: ICD-10-CM

## 2023-06-19 DIAGNOSIS — O99.019 ANEMIA COMPLICATING PREGNANCY, UNSPECIFIED TRIMESTER: ICD-10-CM

## 2023-06-19 PROCEDURE — 0503F POSTPARTUM CARE VISIT: CPT

## 2023-06-19 NOTE — HISTORY OF PRESENT ILLNESS
[Complications:___] : complications include: [unfilled] [Delivery Date: ___] : on [unfilled] [Primary C/S] : delivered by  section [Postpartum Follow Up] : postpartum follow up [Breastfeeding] : currently nursing [Abdominal Pain] : no abdominal pain [Back Pain] : no back pain [Breast Pain] : no breast pain [Chest Pain] : no chest pain [S/Sx PP Depression] : no signs/symptoms of postpartum depression [Heavy Bleeding] : no heavy bleeding [Incisional Drainage] : no incisional drainage [Incisional Pain] : no incisional pain [Irregular Bleeding] : no irregular bleeding [Leg Pain] : no leg pain [Shortness of Breath] : no shortness of breath [Suicidal Ideation] : no suicidal ideation [Vaginal Discharge] : no vaginal discharge [None] : No associated symptoms are reported [Clean/Dry/Intact] : clean, dry and intact [Erythema] : not erythematous [Swelling] : not swollen [Dehiscence] : not dehisced [Doing Well] : is doing well [No Sign of Infection] : is showing no signs of infection [Excellent Pain Control] : has excellent pain control [No [place]] : to avoid sexual intercourse [No Tampons/Suppositories] : against using tampons or vaginal suppositories [No Sports] : not to participate in sports [FreeTextEntry8] : pt here for incision check. no complaints.  [de-identified] : for macrosomia [de-identified] : [] briefly discussed birth control options. pt interested in nexplanon [] RTC in 3wks for postpartum exam

## 2023-07-10 ENCOUNTER — APPOINTMENT (OUTPATIENT)
Dept: OBGYN | Facility: CLINIC | Age: 28
End: 2023-07-10
Payer: COMMERCIAL

## 2023-07-10 VITALS
HEIGHT: 63 IN | RESPIRATION RATE: 18 BRPM | WEIGHT: 144 LBS | TEMPERATURE: 98 F | HEART RATE: 112 BPM | OXYGEN SATURATION: 96 % | SYSTOLIC BLOOD PRESSURE: 116 MMHG | DIASTOLIC BLOOD PRESSURE: 67 MMHG | BODY MASS INDEX: 25.52 KG/M2

## 2023-07-10 DIAGNOSIS — N63.12 UNSPECIFIED LUMP IN THE RIGHT BREAST, UPPER INNER QUADRANT: ICD-10-CM

## 2023-07-10 DIAGNOSIS — Z98.891 ENCOUNTER FOR ROUTINE POSTPARTUM FOLLOW-UP: ICD-10-CM

## 2023-07-10 PROCEDURE — 0503F POSTPARTUM CARE VISIT: CPT

## 2023-07-10 NOTE — HISTORY OF PRESENT ILLNESS
[Delivery Date: ___] : on [unfilled] [___] : a [unfilled] ~Ucm area of erythema [Tenderness Of The Right Breast] : tenderness [The Left Breast Was Examined] : a normal appearance [Complications:___] : complications include: [unfilled] [Primary C/S] : delivered by  section [Breastfeeding] : currently nursing [Resumed Menses] : has not resumed her menses [Resumed [place]] : has not resumed intercourse [Intended Contraception] : Intended Contraception: [Condoms] : condoms [Abdominal Pain] : no abdominal pain [Back Pain] : no back pain [Breast Pain] : no breast pain [BreastFeeding Problems] : no breastfeeding problems [Chest Pain] : no chest pain [Cracked Nipples] : no cracked nipples [S/Sx PP Depression] : no signs/symptoms of postpartum depression [Heavy Bleeding] : no heavy bleeding [Incisional Drainage] : no incisional drainage [Incisional Pain] : no incisional pain [Irregular Bleeding] : no irregular bleeding [Leg Pain] : no leg pain [Shortness of Breath] : no shortness of breath [Suicidal Ideation] : no suicidal ideation [Vaginal Discharge] : no vaginal discharge [Clean/Dry/Intact] : clean, dry and intact [Erythema] : not erythematous [Swelling] : not swollen [Dehiscence] : not dehisced [Healed] : healed [Normal] : the vagina was normal [___cm] : a ~M [unfilled] ~Ucm mass was palpated deep to the nipple [None] : None [FreeTextEntry8] : pt here for postpartum visit. reports this weekend had right breast tenderness and redness and fever, did televisit through insurance and was dx w/ mastitis. taking keflex x10 days. last fever was friday. symptoms improving. also noted right breast lump since started breast feeding, hasn't fluctuated in size. no tenderness. otherwise doing well.  [de-identified] : [] mastitis- advised to continue abx x10 days. care discussed. pt to return if worsening or non-resolving symptoms [] right breast lump- breast US [] kegal exercise discussed [] risk of short interval pregnancy discussed. contraception counseling provided. RTC in  2 months for annual gyn exam\par Alla SIMMONS

## 2023-09-13 ENCOUNTER — APPOINTMENT (OUTPATIENT)
Dept: OBGYN | Facility: CLINIC | Age: 28
End: 2023-09-13
Payer: COMMERCIAL

## 2023-09-13 VITALS
DIASTOLIC BLOOD PRESSURE: 75 MMHG | WEIGHT: 148 LBS | BODY MASS INDEX: 26.22 KG/M2 | HEART RATE: 104 BPM | TEMPERATURE: 98.6 F | RESPIRATION RATE: 18 BRPM | OXYGEN SATURATION: 97 % | HEIGHT: 63 IN | SYSTOLIC BLOOD PRESSURE: 110 MMHG

## 2023-09-13 DIAGNOSIS — N89.8 OTHER SPECIFIED NONINFLAMMATORY DISORDERS OF VAGINA: ICD-10-CM

## 2023-09-13 DIAGNOSIS — Z01.419 ENCOUNTER FOR GYNECOLOGICAL EXAMINATION (GENERAL) (ROUTINE) W/OUT ABNORMAL FINDINGS: ICD-10-CM

## 2023-09-13 LAB
HCG UR QL: NEGATIVE
QUALITY CONTROL: YES

## 2023-09-13 PROCEDURE — 99395 PREV VISIT EST AGE 18-39: CPT

## 2023-09-13 PROCEDURE — 81025 URINE PREGNANCY TEST: CPT

## 2023-09-13 RX ORDER — ESTRADIOL 0.1 MG/G
0.1 CREAM VAGINAL
Qty: 1 | Refills: 3 | Status: ACTIVE | COMMUNITY
Start: 2023-09-13 | End: 1900-01-01

## 2023-09-19 LAB — CYTOLOGY CVX/VAG DOC THIN PREP: NORMAL

## 2024-03-16 ENCOUNTER — EMERGENCY (EMERGENCY)
Facility: HOSPITAL | Age: 29
LOS: 1 days | Discharge: HOME | End: 2024-03-16
Attending: EMERGENCY MEDICINE
Payer: COMMERCIAL

## 2024-03-16 VITALS
SYSTOLIC BLOOD PRESSURE: 123 MMHG | OXYGEN SATURATION: 98 % | WEIGHT: 149.91 LBS | DIASTOLIC BLOOD PRESSURE: 70 MMHG | HEIGHT: 64 IN | HEART RATE: 99 BPM | RESPIRATION RATE: 18 BRPM | TEMPERATURE: 98 F

## 2024-03-16 LAB
ALBUMIN SERPL ELPH-MCNC: 3.8 G/DL — SIGNIFICANT CHANGE UP (ref 3.5–5)
ALP SERPL-CCNC: 90 U/L — SIGNIFICANT CHANGE UP (ref 40–120)
ALT FLD-CCNC: 16 U/L DA — SIGNIFICANT CHANGE UP (ref 10–60)
ANION GAP SERPL CALC-SCNC: 4 MMOL/L — LOW (ref 5–17)
APPEARANCE UR: CLEAR — SIGNIFICANT CHANGE UP
AST SERPL-CCNC: 15 U/L — SIGNIFICANT CHANGE UP (ref 10–40)
BACTERIA # UR AUTO: ABNORMAL /HPF
BASOPHILS # BLD AUTO: 0.03 K/UL — SIGNIFICANT CHANGE UP (ref 0–0.2)
BASOPHILS NFR BLD AUTO: 0.2 % — SIGNIFICANT CHANGE UP (ref 0–2)
BILIRUB SERPL-MCNC: 0.5 MG/DL — SIGNIFICANT CHANGE UP (ref 0.2–1.2)
BILIRUB UR-MCNC: NEGATIVE — SIGNIFICANT CHANGE UP
BUN SERPL-MCNC: 8 MG/DL — SIGNIFICANT CHANGE UP (ref 7–18)
CALCIUM SERPL-MCNC: 9.5 MG/DL — SIGNIFICANT CHANGE UP (ref 8.4–10.5)
CHLORIDE SERPL-SCNC: 104 MMOL/L — SIGNIFICANT CHANGE UP (ref 96–108)
CO2 SERPL-SCNC: 28 MMOL/L — SIGNIFICANT CHANGE UP (ref 22–31)
COLOR SPEC: YELLOW — SIGNIFICANT CHANGE UP
CREAT SERPL-MCNC: 0.83 MG/DL — SIGNIFICANT CHANGE UP (ref 0.5–1.3)
DIFF PNL FLD: NEGATIVE — SIGNIFICANT CHANGE UP
EGFR: 98 ML/MIN/1.73M2 — SIGNIFICANT CHANGE UP
EOSINOPHIL # BLD AUTO: 0.18 K/UL — SIGNIFICANT CHANGE UP (ref 0–0.5)
EOSINOPHIL NFR BLD AUTO: 1.3 % — SIGNIFICANT CHANGE UP (ref 0–6)
EPI CELLS # UR: PRESENT
GLUCOSE SERPL-MCNC: 125 MG/DL — HIGH (ref 70–99)
GLUCOSE UR QL: NEGATIVE MG/DL — SIGNIFICANT CHANGE UP
HCT VFR BLD CALC: 40.2 % — SIGNIFICANT CHANGE UP (ref 34.5–45)
HGB BLD-MCNC: 13.3 G/DL — SIGNIFICANT CHANGE UP (ref 11.5–15.5)
IMM GRANULOCYTES NFR BLD AUTO: 0.4 % — SIGNIFICANT CHANGE UP (ref 0–0.9)
KETONES UR-MCNC: NEGATIVE MG/DL — SIGNIFICANT CHANGE UP
LEUKOCYTE ESTERASE UR-ACNC: NEGATIVE — SIGNIFICANT CHANGE UP
LYMPHOCYTES # BLD AUTO: 17.3 % — SIGNIFICANT CHANGE UP (ref 13–44)
LYMPHOCYTES # BLD AUTO: 2.34 K/UL — SIGNIFICANT CHANGE UP (ref 1–3.3)
MCHC RBC-ENTMCNC: 30 PG — SIGNIFICANT CHANGE UP (ref 27–34)
MCHC RBC-ENTMCNC: 33.1 GM/DL — SIGNIFICANT CHANGE UP (ref 32–36)
MCV RBC AUTO: 90.7 FL — SIGNIFICANT CHANGE UP (ref 80–100)
MONOCYTES # BLD AUTO: 0.58 K/UL — SIGNIFICANT CHANGE UP (ref 0–0.9)
MONOCYTES NFR BLD AUTO: 4.3 % — SIGNIFICANT CHANGE UP (ref 2–14)
NEUTROPHILS # BLD AUTO: 10.31 K/UL — HIGH (ref 1.8–7.4)
NEUTROPHILS NFR BLD AUTO: 76.5 % — SIGNIFICANT CHANGE UP (ref 43–77)
NITRITE UR-MCNC: NEGATIVE — SIGNIFICANT CHANGE UP
NRBC # BLD: 0 /100 WBCS — SIGNIFICANT CHANGE UP (ref 0–0)
PH UR: 5.5 — SIGNIFICANT CHANGE UP (ref 5–8)
PLATELET # BLD AUTO: 245 K/UL — SIGNIFICANT CHANGE UP (ref 150–400)
POTASSIUM SERPL-MCNC: 4.1 MMOL/L — SIGNIFICANT CHANGE UP (ref 3.5–5.3)
POTASSIUM SERPL-SCNC: 4.1 MMOL/L — SIGNIFICANT CHANGE UP (ref 3.5–5.3)
PROT SERPL-MCNC: 8.5 G/DL — HIGH (ref 6–8.3)
PROT UR-MCNC: NEGATIVE MG/DL — SIGNIFICANT CHANGE UP
RBC # BLD: 4.43 M/UL — SIGNIFICANT CHANGE UP (ref 3.8–5.2)
RBC # FLD: 14 % — SIGNIFICANT CHANGE UP (ref 10.3–14.5)
RBC CASTS # UR COMP ASSIST: 1 /HPF — SIGNIFICANT CHANGE UP (ref 0–4)
SODIUM SERPL-SCNC: 136 MMOL/L — SIGNIFICANT CHANGE UP (ref 135–145)
SP GR SPEC: 1.02 — SIGNIFICANT CHANGE UP (ref 1–1.03)
UROBILINOGEN FLD QL: 0.2 MG/DL — SIGNIFICANT CHANGE UP (ref 0.2–1)
WBC # BLD: 13.49 K/UL — HIGH (ref 3.8–10.5)
WBC # FLD AUTO: 13.49 K/UL — HIGH (ref 3.8–10.5)
WBC UR QL: 1 /HPF — SIGNIFICANT CHANGE UP (ref 0–5)

## 2024-03-16 PROCEDURE — 87077 CULTURE AEROBIC IDENTIFY: CPT

## 2024-03-16 PROCEDURE — 87205 SMEAR GRAM STAIN: CPT

## 2024-03-16 PROCEDURE — 99284 EMERGENCY DEPT VISIT MOD MDM: CPT | Mod: 25

## 2024-03-16 PROCEDURE — 87086 URINE CULTURE/COLONY COUNT: CPT

## 2024-03-16 PROCEDURE — 80053 COMPREHEN METABOLIC PANEL: CPT

## 2024-03-16 PROCEDURE — 36415 COLL VENOUS BLD VENIPUNCTURE: CPT

## 2024-03-16 PROCEDURE — 96374 THER/PROPH/DIAG INJ IV PUSH: CPT

## 2024-03-16 PROCEDURE — 87070 CULTURE OTHR SPECIMN AEROBIC: CPT

## 2024-03-16 PROCEDURE — 87186 SC STD MICRODIL/AGAR DIL: CPT

## 2024-03-16 PROCEDURE — 99285 EMERGENCY DEPT VISIT HI MDM: CPT

## 2024-03-16 PROCEDURE — 81001 URINALYSIS AUTO W/SCOPE: CPT

## 2024-03-16 PROCEDURE — 85025 COMPLETE CBC W/AUTO DIFF WBC: CPT

## 2024-03-16 RX ORDER — KETOROLAC TROMETHAMINE 30 MG/ML
15 SYRINGE (ML) INJECTION ONCE
Refills: 0 | Status: DISCONTINUED | OUTPATIENT
Start: 2024-03-16 | End: 2024-03-16

## 2024-03-16 RX ORDER — CEFUROXIME AXETIL 250 MG
1 TABLET ORAL
Qty: 14 | Refills: 0
Start: 2024-03-16 | End: 2024-03-22

## 2024-03-16 RX ADMIN — Medication 15 MILLIGRAM(S): at 21:24

## 2024-03-16 NOTE — ED ADULT NURSE NOTE - NSFALLUNIVINTERV_ED_ALL_ED
Bed/Stretcher in lowest position, wheels locked, appropriate side rails in place/Call bell, personal items and telephone in reach/Instruct patient to call for assistance before getting out of bed/chair/stretcher/Non-slip footwear applied when patient is off stretcher/[place] to call system/Physically safe environment - no spills, clutter or unnecessary equipment/Purposeful proactive rounding/Room/bathroom lighting operational, light cord in reach

## 2024-03-16 NOTE — ED PROVIDER NOTE - PROGRESS NOTE DETAILS
OBGYN team at bedside for I&D, pt tolerated well, strict return precautions given, pt verbalized understanding. OBGYN team at bedside for I&D, pt tolerated well. Dr. Alaniz requesting cefuroxime rx, sent to pharmacy, strict return precautions given, pt verbalized understanding.

## 2024-03-16 NOTE — ED PROVIDER NOTE - CLINICAL SUMMARY MEDICAL DECISION MAKING FREE TEXT BOX
29-year-old female no PMH PSH c section 9 months ago presenting to emergency department for vulvar abscess since Thursday.  No history of abscesses in the past.  Patient states she went to urgent care was given Augmentin and ibuprofen but has not improved the pain or size of the swelling.  Endorsing chills but no measured fevers.  Also notes burning upon urination.  Denies chest pain, shortness of breath, abdominal pain, nausea, vomiting, diarrhea, rash, abnormal vaginal discharge, other complaint. PE as above concern for Bartholin's abscess, GYN consulted, will obtain basic labs, pain control, reassess, dispo accordingly. 29-year-old female no PMH PSH c section 9 months ago presenting to emergency department for vulvar abscess since Thursday.  No history of abscesses in the past.  Patient states she went to urgent care was given Augmentin and ibuprofen but has not improved the pain or size of the swelling.  Endorsing chills but no measured fevers.  Also notes burning upon urination but is unsure if that is from the pain of the abscess..  Denies chest pain, shortness of breath, abdominal pain, nausea, vomiting, diarrhea, rash, abnormal vaginal discharge, other complaint. PE as above concern for Bartholin's abscess, GYN consulted, will obtain basic labs, pain control, reassess, dispo accordingly.

## 2024-03-16 NOTE — ED PROVIDER NOTE - PATIENT PORTAL LINK FT
You can access the FollowMyHealth Patient Portal offered by [facility] by registering at the following website: [email or web address]. By joining [employer]’s FollowMyHealth portal, you will also be able to view your health information using other applications (apps) compatible with our system.

## 2024-03-16 NOTE — CONSULT NOTE ADULT - SUBJECTIVE AND OBJECTIVE BOX
OBGYN PA Note  HPI: 30yo  LMP 3/6/2024 presents with vulvar pain. Patient states she began having pain on Tuesday and went to urgent care the following day where she was given antibiotics. Patient states her vulvar area became increasingly tender despite antibiotic use. Patient also began having chills at home which prompted her to come in. Patient denies vb, vaginal discharge, abd pain, cp, sob, dizziness, palpitations, n/v/d/c, fever;     pob/gynhx:   FT Prim c/s for LGA ; Denies h/o std's; abn pap smear; no fibroids; normal menses  pmhx: denies  pshx: Prim c/s-  not complicated  all: nkda  meds: PNV  sochx:  Occasional etoh use. Denies drug/tobacco use    REVIEW OF SYSTEMS: see HPI	    PE:  Vital Signs Last 24 Hrs  T(C): 36.6 (16 Mar 2024 18:03), Max: 36.6 (16 Mar 2024 18:03)  T(F): 97.8 (16 Mar 2024 18:03), Max: 97.8 (16 Mar 2024 18:03)  HR: 99 (16 Mar 2024 18:03) (99 - 99)  BP: 123/70 (16 Mar 2024 18:03) (123/70 - 123/70)  BP(mean): --  RR: 18 (16 Mar 2024 18:03) (18 - 18)  SpO2: 98% (16 Mar 2024 18:03) (98% - 98%)      abd: +bs; soft, nt, nd, no rebound or guarding; no cvat b/l  pelvis: no cmt; no vaginal bleeding or abnormal discharge; no odor, closed/long, no uterine tenderness. No adnexal tenderness or masses appreciated b/l     LABS:                        13.3   13.49 )-----------( 245      ( 16 Mar 2024 21:00 )             40.2     03-16    136  |  104  |  8   ----------------------------<  125<H>  4.1   |  28  |  0.83    Ca    9.5      16 Mar 2024 21:00    TPro  8.5<H>  /  Alb  3.8  /  TBili  0.5  /  DBili  x   /  AST  15  /  ALT  16  /  AlkPhos  90  03-16      Urinalysis Basic - ( 16 Mar 2024 21:00 )    Color: Yellow / Appearance: Clear / S.016 / pH: x  Gluc: 125 mg/dL / Ketone: Negative mg/dL  / Bili: Negative / Urobili: 0.2 mg/dL   Blood: x / Protein: Negative mg/dL / Nitrite: Negative   Leuk Esterase: Negative / RBC: 1 /HPF / WBC 1 /HPF   Sq Epi: x / Non Sq Epi: x / Bacteria: Moderate /HPF        RADIOLOGY & ADDITIONAL STUDIES:  sono  ct scan    a/p    -d/w  JANNY HIGGINS Note  HPI: 28yo  LMP 3/6/2024 presents with vulvar pain. Patient states she began having pain on Tuesday and went to urgent care the following day where she was given antibiotics. Patient states her vulvar area became increasingly tender despite antibiotic use. Patient also began having chills at home which prompted her to come in. Patient denies vb, vaginal discharge, abd pain, cp, sob, dizziness, palpitations, n/v/d/c, fever;     pob/gynhx:   FT Prim c/s for LGA ; Denies h/o std's; abn pap smear; no fibroids; normal menses  pmhx: denies  pshx: Prim c/s-  not complicated  all: nkda  meds: PNV  sochx:  Occasional etoh use. Denies drug/tobacco use    REVIEW OF SYSTEMS: see HPI	    PE:  Vital Signs Last 24 Hrs  T(C): 36.6 (16 Mar 2024 18:03), Max: 36.6 (16 Mar 2024 18:03)  T(F): 97.8 (16 Mar 2024 18:03), Max: 97.8 (16 Mar 2024 18:03)  HR: 99 (16 Mar 2024 18:03) (99 - 99)  BP: 123/70 (16 Mar 2024 18:03) (123/70 - 123/70)  BP(mean): --  RR: 18 (16 Mar 2024 18:03) (18 - 18)  SpO2: 98% (16 Mar 2024 18:03) (98% - 98%)      abd: +bs; soft, nt, nd, no rebound or guarding; no cvat b/l  pelvis: Right Bartholin cyst appreciated on exam, patient very tender to touch. Area was numbed using lidocaine, then cyst was aspirated with needle by Dr. Alaniz. Wound cultures sent. No cmt; no vaginal bleeding or abnormal discharge; no odor, closed/long, no uterine tenderness. No adnexal tenderness or masses appreciated b/l     LABS:                        13.3   13.49 )-----------( 245      ( 16 Mar 2024 21:00 )             40.2     03-16    136  |  104  |  8   ----------------------------<  125<H>  4.1   |  28  |  0.83    Ca    9.5      16 Mar 2024 21:00    TPro  8.5<H>  /  Alb  3.8  /  TBili  0.5  /  DBili  x   /  AST  15  /  ALT  16  /  AlkPhos  90  03-      Urinalysis Basic - ( 16 Mar 2024 21:00 )    Color: Yellow / Appearance: Clear / S.016 / pH: x  Gluc: 125 mg/dL / Ketone: Negative mg/dL  / Bili: Negative / Urobili: 0.2 mg/dL   Blood: x / Protein: Negative mg/dL / Nitrite: Negative   Leuk Esterase: Negative / RBC: 1 /HPF / WBC 1 /HPF   Sq Epi: x / Non Sq Epi: x / Bacteria: Moderate /HPF

## 2024-03-16 NOTE — ED PROVIDER NOTE - CARE PROVIDER_API CALL
Romeo Alaniz.  Obstetrics and Gynecology  [address], [address] [place] [id number]  Phone: [phone]  Fax: [phone]  Follow Up Time:

## 2024-03-16 NOTE — CONSULT NOTE ADULT - ASSESSMENT
A/P: 30 yo LMP 3/6/24  presenting with bartholin cyst. Patient is stable  - discharge to home  - follow up on Tuesday with Dr. Alaniz in the office.   - Cefuroxime 500mg for 7 days BID  -d/w Dr Corbin SOLIMAN [place] attending

## 2024-03-16 NOTE — ED ADULT NURSE NOTE - OBJECTIVE STATEMENT
Patient presented to the ED complaining of cyst in her vulva since 2 days ago. Patient states it feels like the same size, but feels tender. Patient denies discharge.

## 2024-03-16 NOTE — ED PROVIDER NOTE - OBJECTIVE STATEMENT
29-year-old female no PMH PSH c section 9 months ago presenting to emergency department for vulvar abscess since Thursday.  No history of abscesses in the past.  Patient states she went to urgent care was given Augmentin and ibuprofen but has not improved the pain or size of the swelling.  Endorsing chills but no measured fevers.  Also notes burning upon urination.  Denies chest pain, shortness of breath, abdominal pain, nausea, vomiting, diarrhea, rash, abnormal vaginal discharge, other complaint. 29-year-old female no PMH PSH c section 9 months ago presenting to emergency department for vulvar abscess since Thursday.  No history of abscesses in the past.  Patient states she went to urgent care was given Augmentin and ibuprofen but has not improved the pain or size of the swelling.  Endorsing chills but no measured fevers.  Also notes burning upon urination but is unsure if that is from the pain of the abscess.  Denies chest pain, shortness of breath, abdominal pain, nausea, vomiting, diarrhea, rash, abnormal vaginal discharge, other complaint.

## 2024-03-16 NOTE — ED ADULT TRIAGE NOTE - CHIEF COMPLAINT QUOTE
as per pt I have a cyst in my vulva went to urgent care 2 days ago prescribed amoxicillin,no discharge.It feels like the same size from before but it's very tender

## 2024-03-16 NOTE — ED PROVIDER NOTE - PHYSICAL EXAMINATION
Gen: NAD, AOx3, able to make needs known, non-toxic  Head: NCAT  HEENT: EOMI, oral mucosa moist, normal conjunctiva  Lung: CTAB, no respiratory distress, no wheezes/rhonchi/rales B/L, speaking in full sentences  CV: RRR, no murmurs  Abd: non distended, soft, nontender, no guarding, no CVA tenderness  : Chaperoned by Iris RN- area of tenderness, swelling and erythema to posterior introitus at approx 8'oclock position   MSK: no visible deformities  Neuro: Appears non focal  Skin: Warm, well perfused, no rash  Psych: normal affect

## 2024-03-16 NOTE — ED PROVIDER NOTE - NSFOLLOWUPINSTRUCTIONS_ED_ALL_ED_FT
1) Follow up with Dr. Alaniz as discussed on Tuesday  2) Return to the ED immediately for new or worsening symptoms   3) Please continue to take any home medications as prescribed  4) Your test results from your ED visit were discussed with you prior to discharge  5) You were provided with a copy of your test results  6) Please take your antibiotic as directed    Bartholin's Cyst Incision and Drainage  Front view of the vagina, pointing out a Bartholin's cyst.  Bartholin's glands are two pea-sized glands located just outside the opening of the vagina. There is one on each side, inside the inner folds of skin of the vagina (labia minora). These glands secrete a fluid through tubes (ducts) that open into the labia minora. If a duct becomes blocked, a gland can swell and fill with mucus (Bartholin's cyst). Sometimes the gland becomes infected with bacteria and fills with pus (Bartholin's abscess).    Incision and drainage is a surgical procedure to open and drain a cyst or abscess. You may need this surgery if:  You have a large cyst that interferes with sexual activity.  You have a painful cyst.  You have a cyst that becomes infected and turns into an abscess.  Tell your health care provider about:  Any allergies you have.  All medicines you are taking, including vitamins, herbs, eye drops, creams, and over-the-counter medicines.  Any problems you or family members have had with anesthetic medicines.  Any bleeding problems you have.  Any surgeries you have had.  Any medical conditions you have.  Whether you are pregnant or may be pregnant.  What are the risks?  Your health care provider will talk with you about risks. These may include:  The cyst coming back (recurring) is the most common risk.  Bleeding.  Infection of a cyst.  Infection spreading from an abscess.  Poor wound healing.  What happens before the procedure?  Surgery safety    Ask your health care provider what steps will be taken to help prevent infection. These steps may include:  Removing hair at the surgery site.  Washing skin with a soap that kills germs.  Taking antibiotics.  General instructions    Ask your health care provider about:  Changing or stopping your regular medicines. These include any diabetes medicines or blood thinners you take.  Taking medicines such as aspirin and ibuprofen. These medicines can thin your blood. Do not take them unless your health care provider tells you to.  Taking over-the-counter medicines, vitamins, herbs, and supplements.  Follow instructions from your health care provider about what you may eat and drink.  If you will be going home right after the procedure, plan to have a responsible adult:  Take you home from the hospital or clinic. You will not be allowed to drive.  Care for you for the time you are told.  What happens during the surgery?  An IV will be inserted into one of your veins.  You may be given:  A sedative. This helps you relax.  Anesthesia. This will:  Numb certain areas of your body.  Make you fall asleep for surgery.  An incision will be made over the cyst or abscess.  The contents of the cyst or abscess will be drained and the space (cavity) flushed out.  A small, thin tube (catheter) may be inserted into the cyst or abscess cavity. This catheter is left in place for about 4 weeks.  If you do not have a catheter placed, the edges of the incision will be stitched with absorbable stitches (sutures) in a way to create a small opening for drainage. Both help prevent recurrence.  The procedure may vary among health care providers and hospitals.    What happens after the procedure?  Your blood pressure, heart rate, breathing rate, and blood oxygen level will be monitored until you leave the hospital or clinic.  If you were given a sedative during the procedure, it can affect you for several hours. Do not drive or operate machinery until your health care provider says that it is safe.  After your procedure, it is common to have mild pain and light discharge from the vagina.  You will go back to have the catheter removed. After removal, there will be a channel from the cyst or abscess cavity to the skin. This will help prevent recurrence.  Summary  Incision and drainage is a surgical procedure to open and drain a cyst or abscess.  The main risk of this procedure is recurrence of the cyst or abscess. To prevent this recurrence, a catheter may be inserted. The incision can also be stitched in a way that allows drainage.  After your procedure, it is common to have mild pain and light discharge from the vagina.  If you were given a sedative during the procedure, it can affect you for several hours. Do not drive or operate machinery until your health care provider says that it is safe.  This information is not intended to replace advice given to you by your health care provider. Make sure you discuss any questions you have with your health care provider.

## 2024-03-17 LAB
GRAM STN FLD: ABNORMAL
SPECIMEN SOURCE: SIGNIFICANT CHANGE UP

## 2024-03-18 LAB
CULTURE RESULTS: NO GROWTH — SIGNIFICANT CHANGE UP
SPECIMEN SOURCE: SIGNIFICANT CHANGE UP

## 2024-03-19 LAB
-  AMOXICILLIN/CLAVULANIC ACID: SIGNIFICANT CHANGE UP
-  AMOXICILLIN/CLAVULANIC ACID: SIGNIFICANT CHANGE UP
-  AMPICILLIN/SULBACTAM: SIGNIFICANT CHANGE UP
-  AMPICILLIN/SULBACTAM: SIGNIFICANT CHANGE UP
-  AMPICILLIN: SIGNIFICANT CHANGE UP
-  AMPICILLIN: SIGNIFICANT CHANGE UP
-  AZTREONAM: SIGNIFICANT CHANGE UP
-  AZTREONAM: SIGNIFICANT CHANGE UP
-  CEFAZOLIN: SIGNIFICANT CHANGE UP
-  CEFAZOLIN: SIGNIFICANT CHANGE UP
-  CEFEPIME: SIGNIFICANT CHANGE UP
-  CEFEPIME: SIGNIFICANT CHANGE UP
-  CEFOXITIN: SIGNIFICANT CHANGE UP
-  CEFOXITIN: SIGNIFICANT CHANGE UP
-  CEFTRIAXONE: SIGNIFICANT CHANGE UP
-  CEFTRIAXONE: SIGNIFICANT CHANGE UP
-  CIPROFLOXACIN: SIGNIFICANT CHANGE UP
-  CIPROFLOXACIN: SIGNIFICANT CHANGE UP
-  ERTAPENEM: SIGNIFICANT CHANGE UP
-  ERTAPENEM: SIGNIFICANT CHANGE UP
-  GENTAMICIN: SIGNIFICANT CHANGE UP
-  GENTAMICIN: SIGNIFICANT CHANGE UP
-  IMIPENEM: SIGNIFICANT CHANGE UP
-  LEVOFLOXACIN: SIGNIFICANT CHANGE UP
-  LEVOFLOXACIN: SIGNIFICANT CHANGE UP
-  MEROPENEM: SIGNIFICANT CHANGE UP
-  MEROPENEM: SIGNIFICANT CHANGE UP
-  PIPERACILLIN/TAZOBACTAM: SIGNIFICANT CHANGE UP
-  PIPERACILLIN/TAZOBACTAM: SIGNIFICANT CHANGE UP
-  TOBRAMYCIN: SIGNIFICANT CHANGE UP
-  TOBRAMYCIN: SIGNIFICANT CHANGE UP
-  TRIMETHOPRIM/SULFAMETHOXAZOLE: SIGNIFICANT CHANGE UP
-  TRIMETHOPRIM/SULFAMETHOXAZOLE: SIGNIFICANT CHANGE UP
METHOD TYPE: SIGNIFICANT CHANGE UP
METHOD TYPE: SIGNIFICANT CHANGE UP

## 2024-03-20 ENCOUNTER — APPOINTMENT (OUTPATIENT)
Dept: OBGYN | Facility: CLINIC | Age: 29
End: 2024-03-20
Payer: COMMERCIAL

## 2024-03-20 VITALS
RESPIRATION RATE: 18 BRPM | HEIGHT: 63 IN | BODY MASS INDEX: 26.93 KG/M2 | DIASTOLIC BLOOD PRESSURE: 68 MMHG | TEMPERATURE: 97.7 F | SYSTOLIC BLOOD PRESSURE: 99 MMHG | WEIGHT: 152 LBS | HEART RATE: 90 BPM

## 2024-03-20 PROCEDURE — 99213 OFFICE O/P EST LOW 20 MIN: CPT

## 2024-03-22 LAB
CULTURE RESULTS: ABNORMAL
ORGANISM # SPEC MICROSCOPIC CNT: ABNORMAL
SPECIMEN SOURCE: SIGNIFICANT CHANGE UP

## 2024-03-24 NOTE — PHYSICAL EXAM
[Appropriately responsive] : appropriately responsive [Chaperone Present] : A chaperone was present in the examining room during all aspects of the physical examination [Alert] : alert [No Acute Distress] : no acute distress

## 2024-03-24 NOTE — PLAN
[FreeTextEntry1] : Advised patient continue taking antibiotics and start doing sitz baths and warm compresses Follow up in 2 weeks for reassessment of lesion

## 2024-03-24 NOTE — HISTORY OF PRESENT ILLNESS
[FreeTextEntry1] : ELVIN ARTHUR ,29 year YO,  ER follow up s/p R bartholin cyst drainage. Patient reports decreased amount of pain and pressure. patient reports taking her antibodies.   PMHX:No  PSHX:No  OBHX: NSVDx1 Non smoker  LMP: 24 Regular Menses Medication : cefuroxime 500 Sexually active not using contraceptive. Last pap was on 23 No Family History of breast cancer.

## 2024-10-16 ENCOUNTER — APPOINTMENT (OUTPATIENT)
Dept: OBGYN | Facility: CLINIC | Age: 29
End: 2024-10-16
Payer: COMMERCIAL

## 2024-10-16 PROCEDURE — ZZZZZ: CPT

## 2024-10-16 PROCEDURE — 99395 PREV VISIT EST AGE 18-39: CPT

## 2024-10-31 ENCOUNTER — TRANSCRIPTION ENCOUNTER (OUTPATIENT)
Age: 29
End: 2024-10-31

## 2025-01-29 ENCOUNTER — APPOINTMENT (OUTPATIENT)
Dept: OBGYN | Facility: CLINIC | Age: 30
End: 2025-01-29
Payer: COMMERCIAL

## 2025-01-29 VITALS
HEIGHT: 63 IN | HEART RATE: 96 BPM | SYSTOLIC BLOOD PRESSURE: 101 MMHG | WEIGHT: 159 LBS | RESPIRATION RATE: 18 BRPM | TEMPERATURE: 97.7 F | DIASTOLIC BLOOD PRESSURE: 68 MMHG | BODY MASS INDEX: 28.17 KG/M2 | OXYGEN SATURATION: 97 %

## 2025-01-29 PROCEDURE — 99213 OFFICE O/P EST LOW 20 MIN: CPT

## 2025-02-05 DIAGNOSIS — N63.0 UNSPECIFIED LUMP IN UNSPECIFIED BREAST: ICD-10-CM

## 2025-03-07 DIAGNOSIS — N63.0 UNSPECIFIED LUMP IN UNSPECIFIED BREAST: ICD-10-CM
